# Patient Record
Sex: FEMALE | Race: WHITE | NOT HISPANIC OR LATINO | URBAN - NONMETROPOLITAN AREA
[De-identification: names, ages, dates, MRNs, and addresses within clinical notes are randomized per-mention and may not be internally consistent; named-entity substitution may affect disease eponyms.]

---

## 2024-07-15 ENCOUNTER — NURSE TRIAGE (OUTPATIENT)
Dept: CALL CENTER | Facility: HOSPITAL | Age: 24
End: 2024-07-15

## 2024-07-15 PROCEDURE — 87491 CHLMYD TRACH DNA AMP PROBE: CPT | Performed by: FAMILY MEDICINE

## 2024-07-15 PROCEDURE — 87086 URINE CULTURE/COLONY COUNT: CPT | Performed by: FAMILY MEDICINE

## 2024-07-15 PROCEDURE — 87591 N.GONORRHOEAE DNA AMP PROB: CPT | Performed by: FAMILY MEDICINE

## 2024-07-15 PROCEDURE — 87661 TRICHOMONAS VAGINALIS AMPLIF: CPT | Performed by: FAMILY MEDICINE

## 2024-07-15 NOTE — TELEPHONE ENCOUNTER
"Reason for Disposition   MODERATE-SEVERE itching (i.e., interferes with school, work, or sleep)    Additional Information   Negative: Sounds like a life-threatening emergency to the triager   Negative: Followed a genital area injury (e.g., vagina, vulva)   Negative: Foreign body in vagina (e.g., tampon)   Negative: Vaginal bleeding is main symptom   Negative: Vaginal discharge is main symptom   Negative: Pain or burning with passing urine (urination) is main symptom   Negative: Menstrual cramps is main symptom   Negative: Abdomen pain is main symptom   Negative: Pubic lice suspected   Negative: Itching or rash of female genital area (e.g., labia, vagina, vulva)   Negative: Pregnant and labor suspected   Negative: Patient sounds very sick or weak to the triager   Negative: [1] SEVERE pain AND [2] not improved 2 hours after pain medicine   Negative: [1] Genital area looks infected (e.g., draining sore, spreading redness) AND [2] fever   Negative: [1] Something is hanging out of the vagina AND [2] can't easily be pushed back inside    Answer Assessment - Initial Assessment Questions  1. SYMPTOM: \"What's the main symptom you're concerned about?\" (e.g., pain, itching, dryness)      Itching and burning in vaginal area  2. LOCATION: \"Where is the  vaginal area located?\" (e.g., inside/outside, left/right)      Outside labial folds  3. ONSET: \"When did the itching  start?\"      Friday  4. PAIN: \"Is there any pain?\" If Yes, ask: \"How bad is it?\" (Scale: 1-10; mild, moderate, severe)    -  MILD (1-3): Doesn't interfere with normal activities.     -  MODERATE (4-7): Interferes with normal activities (e.g., work or school) or awakens from sleep.      -  SEVERE (8-10): Excruciating pain, unable to do any normal activities.      Discomfort, moderate  5. ITCHING: \"Is there any itching?\" If Yes, ask: \"How bad is it?\" (Scale: 1-10; mild, moderate, severe)      Yes, severe  6. CAUSE: \"What do you think is causing the discharge?\" \"Have " "you had the same problem before? What happened then?\"      Vaginal infection  7. OTHER SYMPTOMS: \"Do you have any other symptoms?\" (e.g., fever, itching, vaginal bleeding, pain with urination, injury to genital area, vaginal foreign body)      Decrease in urination and urine production   8. PREGNANCY: \"Is there any chance you are pregnant?\" \"When was your last menstrual period?\"      No, last menstrual cycle last week of June 27th    Protocols used: Vaginal Symptoms-ADULT-    "

## 2024-07-15 NOTE — TELEPHONE ENCOUNTER
Vaginal Itching 07/15/2024 Caller concerned about vaginal itching and burning. States she has tried OTC treatment.    Reviewed guideline with caller, advises to be seen within 24 hours. Directed caller to UC centers located near her position on [facility]. She is from out of town and her PCP is 3 hours away. States she will call the UC at [facility] to see if she can be seen there.